# Patient Record
Sex: MALE | Race: WHITE | NOT HISPANIC OR LATINO | Employment: FULL TIME | URBAN - METROPOLITAN AREA
[De-identification: names, ages, dates, MRNs, and addresses within clinical notes are randomized per-mention and may not be internally consistent; named-entity substitution may affect disease eponyms.]

---

## 2018-09-10 ENCOUNTER — HOSPITAL ENCOUNTER (OUTPATIENT)
Dept: FAMILY MEDICINE CLINIC | Facility: CLINIC | Age: 53
Setting detail: SPECIMEN
Discharge: HOME | End: 2018-09-10
Attending: FAMILY MEDICINE | Admitting: FAMILY MEDICINE

## 2018-09-10 LAB
ALBUMIN SERPL-MCNC: 3.9 G/DL (ref 3.5–4.8)
ALBUMIN/GLOB SERPL: 1.2 {RATIO} (ref 1–1.7)
ALP SERPL-CCNC: 58 IU/L (ref 32–91)
ALT SERPL-CCNC: 25 IU/L (ref 17–63)
ANION GAP SERPL CALC-SCNC: 10 MMOL/L (ref 10–20)
AST SERPL-CCNC: 18 IU/L (ref 15–41)
BASOPHILS # BLD AUTO: 0.1 10*3/UL (ref 0–0.2)
BASOPHILS NFR BLD AUTO: 1 % (ref 0–2)
BILIRUB SERPL-MCNC: 0.3 MG/DL (ref 0.3–1.2)
BILIRUB UR QL STRIP: NEGATIVE MG/DL
BUN SERPL-MCNC: 12 MG/DL (ref 8–20)
BUN/CREAT SERPL: 13.3 (ref 6.2–20.3)
CALCIUM SERPL-MCNC: 9.3 MG/DL (ref 8.9–10.3)
CASTS URNS QL MICRO: NORMAL /[LPF]
CHLORIDE SERPL-SCNC: 105 MMOL/L (ref 101–111)
CHOLEST SERPL-MCNC: 182 MG/DL
CHOLEST/HDLC SERPL: 3.8 {RATIO}
COLOR UR: YELLOW
CONV BACTERIA IN URINE MICRO: NEGATIVE
CONV CLARITY OF URINE: CLEAR
CONV CO2: 26 MMOL/L (ref 22–32)
CONV HYALINE CASTS IN URINE MICRO: 0 /[LPF] (ref 0–5)
CONV LDL CHOLESTEROL DIRECT: 116 MG/DL (ref 0–100)
CONV PROTEIN IN URINE BY AUTOMATED TEST STRIP: NEGATIVE MG/DL
CONV SMALL ROUND CELLS: NORMAL /[HPF]
CONV TOTAL PROTEIN: 7.1 G/DL (ref 6.1–7.9)
CONV UROBILINOGEN IN URINE BY AUTOMATED TEST STRIP: 0.2 MG/DL
CREAT UR-MCNC: 0.9 MG/DL (ref 0.7–1.2)
CULTURE INDICATED?: NORMAL
DIFFERENTIAL METHOD BLD: (no result)
EOSINOPHIL # BLD AUTO: 0.2 10*3/UL (ref 0–0.3)
EOSINOPHIL # BLD AUTO: 3 % (ref 0–3)
ERYTHROCYTE [DISTWIDTH] IN BLOOD BY AUTOMATED COUNT: 14 % (ref 11.5–14.5)
GLOBULIN UR ELPH-MCNC: 3.2 G/DL (ref 2.5–3.8)
GLUCOSE SERPL-MCNC: 118 MG/DL (ref 65–99)
GLUCOSE UR QL: NEGATIVE MG/DL
HCT VFR BLD AUTO: 44.4 % (ref 40–54)
HDLC SERPL-MCNC: 49 MG/DL
HGB BLD-MCNC: 15.3 G/DL (ref 14–18)
HGB UR QL STRIP: NEGATIVE
KETONES UR QL STRIP: NEGATIVE MG/DL
LDLC/HDLC SERPL: 2.4 {RATIO}
LEUKOCYTE ESTERASE UR QL STRIP: NEGATIVE
LIPID INTERPRETATION: ABNORMAL
LYMPHOCYTES # BLD AUTO: 2.4 10*3/UL (ref 0.8–4.8)
LYMPHOCYTES NFR BLD AUTO: 27 % (ref 18–42)
MCH RBC QN AUTO: 29 PG (ref 26–32)
MCHC RBC AUTO-ENTMCNC: 34.4 G/DL (ref 32–36)
MCV RBC AUTO: 84.3 FL (ref 80–94)
MONOCYTES # BLD AUTO: 0.6 10*3/UL (ref 0.1–1.3)
MONOCYTES NFR BLD AUTO: 7 % (ref 2–11)
NEUTROPHILS # BLD AUTO: 5.5 10*3/UL (ref 2.3–8.6)
NEUTROPHILS NFR BLD AUTO: 62 % (ref 50–75)
NITRITE UR QL STRIP: NEGATIVE
NRBC BLD AUTO-RTO: 0 /100{WBCS}
NRBC/RBC NFR BLD MANUAL: 0 10*3/UL
PH UR STRIP.AUTO: 5 [PH] (ref 4.5–8)
PLATELET # BLD AUTO: 249 10*3/UL (ref 150–450)
PMV BLD AUTO: 7.5 FL (ref 7.4–10.4)
POTASSIUM SERPL-SCNC: 4 MMOL/L (ref 3.6–5.1)
RBC # BLD AUTO: 5.27 10*6/UL (ref 4.6–6)
RBC #/AREA URNS HPF: 0 /[HPF] (ref 0–3)
SODIUM SERPL-SCNC: 137 MMOL/L (ref 136–144)
SP GR UR: 1.02 (ref 1–1.03)
SPERM URNS QL MICRO: NORMAL /[HPF]
SQUAMOUS SPT QL MICRO: 0 /[HPF] (ref 0–5)
TRIGL SERPL-MCNC: 115 MG/DL
UNIDENT CRYS URNS QL MICRO: NORMAL /[HPF]
VLDLC SERPL CALC-MCNC: 17.4 MG/DL
WBC # BLD AUTO: 8.8 10*3/UL (ref 4.5–11.5)
WBC #/AREA URNS HPF: 0 /[HPF] (ref 0–5)
YEAST SPEC QL WET PREP: NORMAL /[HPF]

## 2019-09-03 RX ORDER — LISINOPRIL AND HYDROCHLOROTHIAZIDE 12.5; 1 MG/1; MG/1
TABLET ORAL
Qty: 90 TABLET | Refills: 2 | Status: SHIPPED | OUTPATIENT
Start: 2019-09-03 | End: 2020-06-01

## 2020-06-01 RX ORDER — LISINOPRIL AND HYDROCHLOROTHIAZIDE 12.5; 1 MG/1; MG/1
TABLET ORAL
Qty: 90 TABLET | Refills: 1 | Status: SHIPPED | OUTPATIENT
Start: 2020-06-01 | End: 2020-11-26

## 2020-11-26 RX ORDER — LISINOPRIL AND HYDROCHLOROTHIAZIDE 12.5; 1 MG/1; MG/1
TABLET ORAL
Qty: 90 TABLET | Refills: 0 | Status: SHIPPED | OUTPATIENT
Start: 2020-11-26 | End: 2021-02-22

## 2021-02-22 RX ORDER — LISINOPRIL AND HYDROCHLOROTHIAZIDE 12.5; 1 MG/1; MG/1
TABLET ORAL
Qty: 90 TABLET | Refills: 0 | Status: SHIPPED | OUTPATIENT
Start: 2021-02-22 | End: 2021-05-20

## 2021-05-20 RX ORDER — LISINOPRIL AND HYDROCHLOROTHIAZIDE 12.5; 1 MG/1; MG/1
TABLET ORAL
Qty: 30 TABLET | Refills: 0 | Status: SHIPPED | OUTPATIENT
Start: 2021-05-20 | End: 2021-06-16

## 2021-06-16 RX ORDER — LISINOPRIL AND HYDROCHLOROTHIAZIDE 12.5; 1 MG/1; MG/1
TABLET ORAL
Qty: 30 TABLET | Refills: 0 | Status: SHIPPED | OUTPATIENT
Start: 2021-06-16 | End: 2021-07-14

## 2021-07-14 RX ORDER — LISINOPRIL AND HYDROCHLOROTHIAZIDE 12.5; 1 MG/1; MG/1
TABLET ORAL
Qty: 90 TABLET | Refills: 1 | Status: SHIPPED | OUTPATIENT
Start: 2021-07-14 | End: 2022-01-10

## 2021-11-09 DIAGNOSIS — E55.9 VITAMIN D DEFICIENCY: ICD-10-CM

## 2021-11-09 DIAGNOSIS — Z00.00 ENCOUNTER FOR PREVENTIVE CARE: ICD-10-CM

## 2021-11-09 DIAGNOSIS — Z12.5 SCREENING FOR PROSTATE CANCER: Primary | ICD-10-CM

## 2021-11-09 DIAGNOSIS — Z13.220 SCREENING FOR HYPERLIPIDEMIA: ICD-10-CM

## 2021-11-15 ENCOUNTER — LAB (OUTPATIENT)
Dept: FAMILY MEDICINE CLINIC | Facility: CLINIC | Age: 56
End: 2021-11-15

## 2021-11-15 DIAGNOSIS — Z12.5 SCREENING FOR PROSTATE CANCER: ICD-10-CM

## 2021-11-15 DIAGNOSIS — E55.9 VITAMIN D DEFICIENCY: ICD-10-CM

## 2021-11-15 DIAGNOSIS — Z00.00 ENCOUNTER FOR PREVENTIVE CARE: ICD-10-CM

## 2021-11-15 DIAGNOSIS — Z13.220 SCREENING FOR HYPERLIPIDEMIA: ICD-10-CM

## 2021-11-15 LAB
25(OH)D3 SERPL-MCNC: 53.4 NG/ML (ref 30–100)
ALBUMIN SERPL-MCNC: 4.4 G/DL (ref 3.5–5.2)
ALBUMIN/GLOB SERPL: 1.5 G/DL
ALP SERPL-CCNC: 49 U/L (ref 39–117)
ALT SERPL W P-5'-P-CCNC: 19 U/L (ref 1–41)
ANION GAP SERPL CALCULATED.3IONS-SCNC: 7.8 MMOL/L (ref 5–15)
AST SERPL-CCNC: 17 U/L (ref 1–40)
BASOPHILS # BLD AUTO: 0.04 10*3/MM3 (ref 0–0.2)
BASOPHILS NFR BLD AUTO: 0.6 % (ref 0–1.5)
BILIRUB SERPL-MCNC: 0.3 MG/DL (ref 0–1.2)
BILIRUB UR QL STRIP: NEGATIVE
BUN SERPL-MCNC: 17 MG/DL (ref 6–20)
BUN/CREAT SERPL: 20 (ref 7–25)
CALCIUM SPEC-SCNC: 9.6 MG/DL (ref 8.6–10.5)
CHLORIDE SERPL-SCNC: 105 MMOL/L (ref 98–107)
CHOLEST SERPL-MCNC: 169 MG/DL (ref 0–200)
CLARITY UR: CLEAR
CO2 SERPL-SCNC: 27.2 MMOL/L (ref 22–29)
COLOR UR: YELLOW
CREAT SERPL-MCNC: 0.85 MG/DL (ref 0.76–1.27)
DEPRECATED RDW RBC AUTO: 38.1 FL (ref 37–54)
EOSINOPHIL # BLD AUTO: 0.15 10*3/MM3 (ref 0–0.4)
EOSINOPHIL NFR BLD AUTO: 2.1 % (ref 0.3–6.2)
ERYTHROCYTE [DISTWIDTH] IN BLOOD BY AUTOMATED COUNT: 12.8 % (ref 12.3–15.4)
GFR SERPL CREATININE-BSD FRML MDRD: 93 ML/MIN/1.73
GLOBULIN UR ELPH-MCNC: 2.9 GM/DL
GLUCOSE SERPL-MCNC: 113 MG/DL (ref 65–99)
GLUCOSE UR STRIP-MCNC: NEGATIVE MG/DL
HBA1C MFR BLD: 5.3 % (ref 3.5–5.6)
HCT VFR BLD AUTO: 43.7 % (ref 37.5–51)
HDLC SERPL-MCNC: 49 MG/DL (ref 40–60)
HGB BLD-MCNC: 15.1 G/DL (ref 13–17.7)
HGB UR QL STRIP.AUTO: NEGATIVE
IMM GRANULOCYTES # BLD AUTO: 0.02 10*3/MM3 (ref 0–0.05)
IMM GRANULOCYTES NFR BLD AUTO: 0.3 % (ref 0–0.5)
KETONES UR QL STRIP: NEGATIVE
LDLC SERPL CALC-MCNC: 101 MG/DL (ref 0–100)
LDLC/HDLC SERPL: 2.02 {RATIO}
LEUKOCYTE ESTERASE UR QL STRIP.AUTO: NEGATIVE
LYMPHOCYTES # BLD AUTO: 2.14 10*3/MM3 (ref 0.7–3.1)
LYMPHOCYTES NFR BLD AUTO: 29.4 % (ref 19.6–45.3)
MCH RBC QN AUTO: 28.8 PG (ref 26.6–33)
MCHC RBC AUTO-ENTMCNC: 34.6 G/DL (ref 31.5–35.7)
MCV RBC AUTO: 83.4 FL (ref 79–97)
MONOCYTES # BLD AUTO: 0.5 10*3/MM3 (ref 0.1–0.9)
MONOCYTES NFR BLD AUTO: 6.9 % (ref 5–12)
NEUTROPHILS NFR BLD AUTO: 4.42 10*3/MM3 (ref 1.7–7)
NEUTROPHILS NFR BLD AUTO: 60.7 % (ref 42.7–76)
NITRITE UR QL STRIP: NEGATIVE
NRBC BLD AUTO-RTO: 0 /100 WBC (ref 0–0.2)
PH UR STRIP.AUTO: 5.5 [PH] (ref 5–8)
PLATELET # BLD AUTO: 265 10*3/MM3 (ref 140–450)
PMV BLD AUTO: 9.4 FL (ref 6–12)
POTASSIUM SERPL-SCNC: 4.6 MMOL/L (ref 3.5–5.2)
PROT SERPL-MCNC: 7.3 G/DL (ref 6–8.5)
PROT UR QL STRIP: NEGATIVE
PSA SERPL-MCNC: 0.36 NG/ML (ref 0–4)
RBC # BLD AUTO: 5.24 10*6/MM3 (ref 4.14–5.8)
SODIUM SERPL-SCNC: 140 MMOL/L (ref 136–145)
SP GR UR STRIP: 1.02 (ref 1–1.03)
TRIGL SERPL-MCNC: 106 MG/DL (ref 0–150)
TSH SERPL DL<=0.05 MIU/L-ACNC: 0.57 UIU/ML (ref 0.27–4.2)
UROBILINOGEN UR QL STRIP: NORMAL
VLDLC SERPL-MCNC: 19 MG/DL (ref 5–40)
WBC # BLD AUTO: 7.27 10*3/MM3 (ref 3.4–10.8)

## 2021-11-15 PROCEDURE — 80061 LIPID PANEL: CPT | Performed by: FAMILY MEDICINE

## 2021-11-15 PROCEDURE — 81003 URINALYSIS AUTO W/O SCOPE: CPT | Performed by: FAMILY MEDICINE

## 2021-11-15 PROCEDURE — 85025 COMPLETE CBC W/AUTO DIFF WBC: CPT | Performed by: FAMILY MEDICINE

## 2021-11-15 PROCEDURE — 82306 VITAMIN D 25 HYDROXY: CPT | Performed by: FAMILY MEDICINE

## 2021-11-15 PROCEDURE — 84443 ASSAY THYROID STIM HORMONE: CPT | Performed by: FAMILY MEDICINE

## 2021-11-15 PROCEDURE — 80053 COMPREHEN METABOLIC PANEL: CPT | Performed by: FAMILY MEDICINE

## 2021-11-15 PROCEDURE — 36415 COLL VENOUS BLD VENIPUNCTURE: CPT

## 2021-11-15 PROCEDURE — G0103 PSA SCREENING: HCPCS | Performed by: FAMILY MEDICINE

## 2021-11-15 PROCEDURE — 83036 HEMOGLOBIN GLYCOSYLATED A1C: CPT | Performed by: FAMILY MEDICINE

## 2021-11-17 ENCOUNTER — OFFICE VISIT (OUTPATIENT)
Dept: FAMILY MEDICINE CLINIC | Facility: CLINIC | Age: 56
End: 2021-11-17

## 2021-11-17 VITALS
HEART RATE: 78 BPM | SYSTOLIC BLOOD PRESSURE: 142 MMHG | HEIGHT: 71 IN | BODY MASS INDEX: 37.66 KG/M2 | DIASTOLIC BLOOD PRESSURE: 86 MMHG | TEMPERATURE: 96.9 F | OXYGEN SATURATION: 98 % | RESPIRATION RATE: 16 BRPM | WEIGHT: 269 LBS

## 2021-11-17 DIAGNOSIS — Z12.11 SCREEN FOR COLON CANCER: ICD-10-CM

## 2021-11-17 DIAGNOSIS — E55.9 VITAMIN D DEFICIENCY: ICD-10-CM

## 2021-11-17 DIAGNOSIS — I10 ESSENTIAL HYPERTENSION: ICD-10-CM

## 2021-11-17 DIAGNOSIS — E66.3 OVERWEIGHT: ICD-10-CM

## 2021-11-17 DIAGNOSIS — Z00.00 PREVENTATIVE HEALTH CARE: Primary | ICD-10-CM

## 2021-11-17 PROCEDURE — 99396 PREV VISIT EST AGE 40-64: CPT | Performed by: FAMILY MEDICINE

## 2021-11-17 RX ORDER — MELATONIN
EVERY 24 HOURS
COMMUNITY
Start: 2019-04-19

## 2021-11-17 NOTE — PROGRESS NOTES
"Chief Complaint  Annual Exam    Subjective          Pineda Tovar Jr presents to [facility]  For annual exam. He has brought in his BP machine his reading is 142/86 in the office.  Overall he is doing well.  Home BP is great.   Feels good.  Work is tough but he is getting through.      Objective   Vital Signs:   /86 (BP Location: Right arm)   Pulse 78   Temp 96.9 °F (36.1 °C) (Infrared)   Resp 16   Ht 180.3 cm (71\")   Wt 122 kg (269 lb)   SpO2 98%   BMI 37.52 kg/m²     Physical Exam  Constitutional:       Appearance: Normal appearance. He is well-developed and normal weight.   HENT:      Head: Normocephalic and atraumatic.      Right Ear: Tympanic membrane, ear canal and external ear normal.      Left Ear: Tympanic membrane, ear canal and external ear normal.      Nose: Nose normal.      Mouth/Throat:      Mouth: Mucous membranes are moist.      Pharynx: Oropharynx is clear. No oropharyngeal exudate.   Eyes:      Extraocular Movements: Extraocular movements intact.      Conjunctiva/sclera: Conjunctivae normal.      Pupils: Pupils are equal, round, and reactive to light.   Cardiovascular:      Rate and Rhythm: Normal rate and regular rhythm.      Pulses: Normal pulses.      Heart sounds: Normal heart sounds.   Pulmonary:      Effort: Pulmonary effort is normal.      Breath sounds: Normal breath sounds.   Abdominal:      General: Bowel sounds are normal.      Palpations: Abdomen is soft.   Musculoskeletal:         General: Normal range of motion.      Cervical back: Normal range of motion and neck supple.   Skin:     General: Skin is warm and dry.   Neurological:      General: No focal deficit present.      Mental Status: He is alert and oriented to person, place, and time. Mental status is at baseline.   Psychiatric:         Mood and Affect: Mood normal.         Behavior: Behavior normal.         Thought Content: Thought content normal.         Judgment: Judgment normal. "        Result Review :                 Assessment and Plan    Diagnoses and all orders for this visit:    1. Preventative health care (Primary)  Assessment & Plan:  Patient needs to have colonoscopy.    Continue meds.      2. Essential hypertension  Assessment & Plan:  Patient is doing well.   BP is 138/88      3. Overweight  Assessment & Plan:  Patient is trying to loose weight.   Over 53656 steps a day.      4. Vitamin D deficiency  Assessment & Plan:  Cont vit D      5. Screen for colon cancer      Follow Up   Return in about 6 months (around 5/17/2022) for Recheck.  Patient was given instructions and counseling regarding his condition or for health maintenance advice. Please see specific information pulled into the AVS if appropriate.

## 2021-11-19 ENCOUNTER — PATIENT ROUNDING (BHMG ONLY) (OUTPATIENT)
Dept: FAMILY MEDICINE CLINIC | Facility: CLINIC | Age: 56
End: 2021-11-19

## 2021-11-19 NOTE — PROGRESS NOTES
November 19, 2021    Hello, may I speak with Pineda Tovar Jr?    My name is Keila      I am  with [facility]  [address]  [place] [address].    Before we get started may I verify your date of birth? 1965    I am calling to officially welcome you to our practice and ask about your recent visit. Is this a good time to talk? No. Voicemail left.    Tell me about your visit with us. What things went well?  Voicemail left.       We're always looking for ways to make our patients' experiences even better. Do you have recommendations on ways we may improve?  Voicemail left.     Overall were you satisfied with your first visit to our practice? Voicemail left.       I appreciate you taking the time to speak with me today. Is there anything else I can do for you? Voicemail left.      Thank you, and have a great day.

## 2022-01-10 RX ORDER — LISINOPRIL AND HYDROCHLOROTHIAZIDE 12.5; 1 MG/1; MG/1
TABLET ORAL
Qty: 90 TABLET | Refills: 1 | Status: SHIPPED | OUTPATIENT
Start: 2022-01-10 | End: 2022-07-06

## 2022-07-06 RX ORDER — LISINOPRIL AND HYDROCHLOROTHIAZIDE 12.5; 1 MG/1; MG/1
TABLET ORAL
Qty: 30 TABLET | Refills: 2 | Status: SHIPPED | OUTPATIENT
Start: 2022-07-06 | End: 2022-10-03

## 2022-10-03 RX ORDER — LISINOPRIL AND HYDROCHLOROTHIAZIDE 12.5; 1 MG/1; MG/1
TABLET ORAL
Qty: 30 TABLET | Refills: 2 | Status: SHIPPED | OUTPATIENT
Start: 2022-10-03 | End: 2022-12-29

## 2022-10-27 ENCOUNTER — OFFICE VISIT (OUTPATIENT)
Dept: FAMILY MEDICINE CLINIC | Facility: CLINIC | Age: 57
End: 2022-10-27

## 2022-10-27 ENCOUNTER — HOSPITAL ENCOUNTER (OUTPATIENT)
Dept: GENERAL RADIOLOGY | Facility: HOSPITAL | Age: 57
Discharge: HOME | End: 2022-10-27
Admitting: FAMILY MEDICINE

## 2022-10-27 DIAGNOSIS — M17.0 PRIMARY OSTEOARTHRITIS OF BOTH KNEES: Primary | ICD-10-CM

## 2022-10-27 DIAGNOSIS — M17.0 PRIMARY OSTEOARTHRITIS OF BOTH KNEES: ICD-10-CM

## 2022-10-27 PROBLEM — Z94.7 HISTORY OF CORNEA TRANSPLANT: Status: ACTIVE | Noted: 2018-11-29

## 2022-10-27 PROBLEM — Z96.1 PSEUDOPHAKIA OF LEFT EYE: Status: ACTIVE | Noted: 2018-11-29

## 2022-10-27 PROCEDURE — 99213 OFFICE O/P EST LOW 20 MIN: CPT | Performed by: FAMILY MEDICINE

## 2022-10-27 PROCEDURE — 73562 X-RAY EXAM OF KNEE 3: CPT

## 2022-10-27 RX ORDER — FLUOROMETHOLONE 0.1 %
SUSPENSION, DROPS(FINAL DOSAGE FORM)(ML) OPHTHALMIC (EYE)
COMMUNITY
Start: 2022-10-10

## 2022-10-27 RX ORDER — MIRTAZAPINE 45 MG/1
45 TABLET, FILM COATED ORAL NIGHTLY
Qty: 30 TABLET | Refills: 0 | Status: SHIPPED | OUTPATIENT
Start: 2022-10-27 | End: 2022-10-27

## 2022-10-27 RX ORDER — MELOXICAM 15 MG/1
15 TABLET ORAL DAILY
Qty: 30 TABLET | Refills: 1 | Status: SHIPPED | OUTPATIENT
Start: 2022-10-27 | End: 2022-12-26

## 2022-10-27 NOTE — PROGRESS NOTES
"Chief Complaint  No chief complaint on file.    Subjective      Pineda Tovar Jr presents to [facility]  History of Present Illness    Objective   Vital Signs:  There were no vitals taken for this visit.  Estimated body mass index is 37.52 kg/m² as calculated from the following:    Height as of 11/17/21: 180.3 cm (71\").    Weight as of 11/17/21: 122 kg (269 lb).    Class 2 Severe Obesity (BMI >=35 and <=39.9). Obesity-related health conditions include the following: hypertension and osteoarthritis. Obesity is unchanged. BMI is is above average; BMI management plan is completed. We discussed low calorie, low carb based diet program, portion control, increasing exercise, joining a fitness center or start home based exercise program and Weight Watchers or other Commercial based weight reduction program.      Physical Exam  Constitutional:       Appearance: He is obese.   Abdominal:      Comments: Overweight   Musculoskeletal:         General: Swelling, tenderness and deformity present.      Right lower leg: Edema present.      Left lower leg: Edema present.      Comments: Bilateral knee crepitus with movement of knees.  Flexion and extension are both very painful and there is crepitus and popping with knee range of motion testing.  He cannot flex the right knee beyond 90 degrees and he cannot extend it completely.  He still holds it about 10% flexed when he tries to fully extend.  When he bends it he can bend to just 90 degrees and then it becomes stiff and painful right behind in the probably from the cyst formation.  He has a slight genu varus deformity both sides in the medial aspect of both knees along the joint line is very tender.  I do not feel much joint space on either the but he can move the left knee a little better than the right and the left knee is not near as tender as the right.  Right knee tends to throb and hurt particular at night.   Neurological:      Mental Status: " He is alert.        Assessment and Plan Diagnoses and all orders for this visit:    1. Primary osteoarthritis of both knees (Primary)   -- I am going to send Pineda to the hospital for bilateral knee x-rays.  I will place him on meloxicam 15 mg a day.  We will schedule him to see Dr. Eloy Jimenez --this was requested by the patient and his wife.  In the meantime he can place ice on the knees at night when he gets home and he should place heat on them in the morning before he goes to work.  It is going to take a while for him to get into see Dr. Jimenez I may bring him back in for some steroid injections but I do not think it will take too long for him to get him.  We will see what the x-ray shows and we will see what his response to the meloxicam is.         I spent 21 minutes caring for Pineda on this date of service. This time includes time spent by me in the following activities:preparing for the visit, reviewing tests, obtaining and/or reviewing a separately obtained history, performing a medically appropriate examination and/or evaluation , counseling and educating the patient/family/caregiver, ordering medications, tests, or procedures, referring and communicating with other health care professionals , documenting information in the medical record, independently interpreting results and communicating that information with the patient/family/caregiver and care coordination         Follow Up       Return in about 6 weeks (around 12/8/2022), or if symptoms worsen or fail to improve, for Recheck--if knees hurting.  .  Patient was given instructions and counseling regarding his condition or for health maintenance advice. Please see specific information pulled into the AVS if appropriate.       Answers for HPI/ROS submitted by the patient on 10/26/2022  What is the primary reason for your visit?: Other  Please describe your symptoms.: Right knee  Have you had these symptoms before?: Yes  How long have you been  having these symptoms?: Greater than 2 weeks  Please list any medications you are currently taking for this condition.: Lisinopril  Please describe any probable cause for these symptoms. 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

## 2022-11-22 DIAGNOSIS — Z00.00 PREVENTATIVE HEALTH CARE: Primary | ICD-10-CM

## 2022-11-22 DIAGNOSIS — Z12.5 SCREENING FOR PROSTATE CANCER: ICD-10-CM

## 2022-11-22 RX ORDER — MIRTAZAPINE 45 MG/1
TABLET, FILM COATED ORAL
Qty: 30 TABLET | Refills: 0 | OUTPATIENT
Start: 2022-11-22

## 2022-11-28 ENCOUNTER — LAB (OUTPATIENT)
Dept: FAMILY MEDICINE CLINIC | Facility: CLINIC | Age: 57
End: 2022-11-28

## 2022-11-28 DIAGNOSIS — Z12.5 SCREENING FOR PROSTATE CANCER: ICD-10-CM

## 2022-11-28 DIAGNOSIS — Z00.00 PREVENTATIVE HEALTH CARE: ICD-10-CM

## 2022-11-28 LAB
ALBUMIN SERPL-MCNC: 4.2 G/DL (ref 3.5–5.2)
ALBUMIN/GLOB SERPL: 1.8 G/DL
ALP SERPL-CCNC: 47 U/L (ref 39–117)
ALT SERPL W P-5'-P-CCNC: 26 U/L (ref 1–41)
ANION GAP SERPL CALCULATED.3IONS-SCNC: 13.8 MMOL/L (ref 5–15)
AST SERPL-CCNC: 15 U/L (ref 1–40)
BASOPHILS # BLD AUTO: 0.04 10*3/MM3 (ref 0–0.2)
BASOPHILS NFR BLD AUTO: 0.4 % (ref 0–1.5)
BILIRUB SERPL-MCNC: 0.5 MG/DL (ref 0–1.2)
BILIRUB UR QL STRIP: NEGATIVE
BUN SERPL-MCNC: 13 MG/DL (ref 6–20)
BUN/CREAT SERPL: 13.7 (ref 7–25)
CALCIUM SPEC-SCNC: 9.4 MG/DL (ref 8.6–10.5)
CHLORIDE SERPL-SCNC: 104 MMOL/L (ref 98–107)
CHOLEST SERPL-MCNC: 133 MG/DL (ref 0–200)
CLARITY UR: CLEAR
CO2 SERPL-SCNC: 26.2 MMOL/L (ref 22–29)
COLOR UR: YELLOW
CREAT SERPL-MCNC: 0.95 MG/DL (ref 0.76–1.27)
DEPRECATED RDW RBC AUTO: 40.9 FL (ref 37–54)
EGFRCR SERPLBLD CKD-EPI 2021: 93.4 ML/MIN/1.73
EOSINOPHIL # BLD AUTO: 0.23 10*3/MM3 (ref 0–0.4)
EOSINOPHIL NFR BLD AUTO: 2.6 % (ref 0.3–6.2)
ERYTHROCYTE [DISTWIDTH] IN BLOOD BY AUTOMATED COUNT: 13.1 % (ref 12.3–15.4)
GLOBULIN UR ELPH-MCNC: 2.3 GM/DL
GLUCOSE SERPL-MCNC: 82 MG/DL (ref 65–99)
GLUCOSE UR STRIP-MCNC: NEGATIVE MG/DL
HCT VFR BLD AUTO: 42.6 % (ref 37.5–51)
HDLC SERPL-MCNC: 39 MG/DL (ref 40–60)
HGB BLD-MCNC: 13.9 G/DL (ref 13–17.7)
HGB UR QL STRIP.AUTO: NEGATIVE
IMM GRANULOCYTES # BLD AUTO: 0.03 10*3/MM3 (ref 0–0.05)
IMM GRANULOCYTES NFR BLD AUTO: 0.3 % (ref 0–0.5)
KETONES UR QL STRIP: NEGATIVE
LDLC SERPL CALC-MCNC: 70 MG/DL (ref 0–100)
LDLC/HDLC SERPL: 1.7 {RATIO}
LEUKOCYTE ESTERASE UR QL STRIP.AUTO: NEGATIVE
LYMPHOCYTES # BLD AUTO: 3.04 10*3/MM3 (ref 0.7–3.1)
LYMPHOCYTES NFR BLD AUTO: 34.2 % (ref 19.6–45.3)
MCH RBC QN AUTO: 27.9 PG (ref 26.6–33)
MCHC RBC AUTO-ENTMCNC: 32.6 G/DL (ref 31.5–35.7)
MCV RBC AUTO: 85.5 FL (ref 79–97)
MONOCYTES # BLD AUTO: 0.59 10*3/MM3 (ref 0.1–0.9)
MONOCYTES NFR BLD AUTO: 6.6 % (ref 5–12)
NEUTROPHILS NFR BLD AUTO: 4.96 10*3/MM3 (ref 1.7–7)
NEUTROPHILS NFR BLD AUTO: 55.9 % (ref 42.7–76)
NITRITE UR QL STRIP: NEGATIVE
NRBC BLD AUTO-RTO: 0 /100 WBC (ref 0–0.2)
PH UR STRIP.AUTO: 5.5 [PH] (ref 5–8)
PLATELET # BLD AUTO: 268 10*3/MM3 (ref 140–450)
PMV BLD AUTO: 9.3 FL (ref 6–12)
POTASSIUM SERPL-SCNC: 3.7 MMOL/L (ref 3.5–5.2)
PROT SERPL-MCNC: 6.5 G/DL (ref 6–8.5)
PROT UR QL STRIP: NEGATIVE
PSA SERPL-MCNC: 0.3 NG/ML (ref 0–4)
RBC # BLD AUTO: 4.98 10*6/MM3 (ref 4.14–5.8)
SODIUM SERPL-SCNC: 144 MMOL/L (ref 136–145)
SP GR UR STRIP: 1.02 (ref 1–1.03)
TRIGL SERPL-MCNC: 139 MG/DL (ref 0–150)
TSH SERPL DL<=0.05 MIU/L-ACNC: 0.92 UIU/ML (ref 0.27–4.2)
UROBILINOGEN UR QL STRIP: NORMAL
VLDLC SERPL-MCNC: 24 MG/DL (ref 5–40)
WBC NRBC COR # BLD: 8.89 10*3/MM3 (ref 3.4–10.8)

## 2022-11-28 PROCEDURE — G0103 PSA SCREENING: HCPCS | Performed by: FAMILY MEDICINE

## 2022-11-28 PROCEDURE — 80050 GENERAL HEALTH PANEL: CPT | Performed by: FAMILY MEDICINE

## 2022-11-28 PROCEDURE — 81003 URINALYSIS AUTO W/O SCOPE: CPT | Performed by: FAMILY MEDICINE

## 2022-11-28 PROCEDURE — 36415 COLL VENOUS BLD VENIPUNCTURE: CPT

## 2022-11-28 PROCEDURE — 80061 LIPID PANEL: CPT | Performed by: FAMILY MEDICINE

## 2022-11-30 ENCOUNTER — OFFICE VISIT (OUTPATIENT)
Dept: FAMILY MEDICINE CLINIC | Facility: CLINIC | Age: 57
End: 2022-11-30

## 2022-11-30 VITALS
RESPIRATION RATE: 16 BRPM | WEIGHT: 278 LBS | DIASTOLIC BLOOD PRESSURE: 80 MMHG | HEART RATE: 96 BPM | HEIGHT: 71 IN | OXYGEN SATURATION: 97 % | SYSTOLIC BLOOD PRESSURE: 154 MMHG | BODY MASS INDEX: 38.92 KG/M2

## 2022-11-30 DIAGNOSIS — Z00.00 PREVENTATIVE HEALTH CARE: Primary | ICD-10-CM

## 2022-11-30 DIAGNOSIS — M17.0 PRIMARY OSTEOARTHRITIS OF BOTH KNEES: ICD-10-CM

## 2022-11-30 DIAGNOSIS — I10 ESSENTIAL HYPERTENSION: ICD-10-CM

## 2022-11-30 DIAGNOSIS — E66.3 OVERWEIGHT: ICD-10-CM

## 2022-11-30 PROCEDURE — 99213 OFFICE O/P EST LOW 20 MIN: CPT | Performed by: FAMILY MEDICINE

## 2022-11-30 RX ORDER — MIRTAZAPINE 45 MG/1
TABLET, FILM COATED ORAL
COMMUNITY
Start: 2022-10-27

## 2022-12-08 ENCOUNTER — HOSPITAL ENCOUNTER (OUTPATIENT)
Dept: CARDIOLOGY | Facility: HOSPITAL | Age: 57
Discharge: HOME | End: 2022-12-08

## 2022-12-08 ENCOUNTER — LAB (OUTPATIENT)
Dept: LAB | Facility: HOSPITAL | Age: 57
End: 2022-12-08

## 2022-12-08 ENCOUNTER — TRANSCRIBE ORDERS (OUTPATIENT)
Dept: ADMINISTRATIVE | Facility: HOSPITAL | Age: 57
End: 2022-12-08

## 2022-12-08 DIAGNOSIS — Z01.818 PRE-OP TESTING: Primary | ICD-10-CM

## 2022-12-08 DIAGNOSIS — Z01.818 PRE-OP TESTING: ICD-10-CM

## 2022-12-08 LAB
ALBUMIN SERPL-MCNC: 4.5 G/DL (ref 3.5–5.2)
ANION GAP SERPL CALCULATED.3IONS-SCNC: 12.6 MMOL/L (ref 5–15)
BASOPHILS # BLD AUTO: 0.05 10*3/MM3 (ref 0–0.2)
BASOPHILS NFR BLD AUTO: 0.5 % (ref 0–1.5)
BUN SERPL-MCNC: 29 MG/DL (ref 6–20)
BUN/CREAT SERPL: 27.4 (ref 7–25)
CALCIUM SPEC-SCNC: 9.8 MG/DL (ref 8.6–10.5)
CHLORIDE SERPL-SCNC: 101 MMOL/L (ref 98–107)
CO2 SERPL-SCNC: 26.4 MMOL/L (ref 22–29)
CREAT SERPL-MCNC: 1.06 MG/DL (ref 0.76–1.27)
DEPRECATED RDW RBC AUTO: 40.4 FL (ref 37–54)
EGFRCR SERPLBLD CKD-EPI 2021: 81.9 ML/MIN/1.73
EOSINOPHIL # BLD AUTO: 0.14 10*3/MM3 (ref 0–0.4)
EOSINOPHIL NFR BLD AUTO: 1.4 % (ref 0.3–6.2)
ERYTHROCYTE [DISTWIDTH] IN BLOOD BY AUTOMATED COUNT: 13.1 % (ref 12.3–15.4)
GLUCOSE SERPL-MCNC: 92 MG/DL (ref 65–99)
HBA1C MFR BLD: 5.1 % (ref 3.5–5.6)
HCT VFR BLD AUTO: 44.3 % (ref 37.5–51)
HGB BLD-MCNC: 14.7 G/DL (ref 13–17.7)
IMM GRANULOCYTES # BLD AUTO: 0.02 10*3/MM3 (ref 0–0.05)
IMM GRANULOCYTES NFR BLD AUTO: 0.2 % (ref 0–0.5)
LYMPHOCYTES # BLD AUTO: 2.35 10*3/MM3 (ref 0.7–3.1)
LYMPHOCYTES NFR BLD AUTO: 23.8 % (ref 19.6–45.3)
MCH RBC QN AUTO: 28.2 PG (ref 26.6–33)
MCHC RBC AUTO-ENTMCNC: 33.2 G/DL (ref 31.5–35.7)
MCV RBC AUTO: 85 FL (ref 79–97)
MONOCYTES # BLD AUTO: 0.9 10*3/MM3 (ref 0.1–0.9)
MONOCYTES NFR BLD AUTO: 9.1 % (ref 5–12)
MRSA DNA SPEC QL NAA+PROBE: NORMAL
NEUTROPHILS NFR BLD AUTO: 6.41 10*3/MM3 (ref 1.7–7)
NEUTROPHILS NFR BLD AUTO: 65 % (ref 42.7–76)
NRBC BLD AUTO-RTO: 0 /100 WBC (ref 0–0.2)
PLATELET # BLD AUTO: 262 10*3/MM3 (ref 140–450)
PMV BLD AUTO: 9.2 FL (ref 6–12)
POTASSIUM SERPL-SCNC: 4 MMOL/L (ref 3.5–5.2)
RBC # BLD AUTO: 5.21 10*6/MM3 (ref 4.14–5.8)
SODIUM SERPL-SCNC: 140 MMOL/L (ref 136–145)
WBC NRBC COR # BLD: 9.87 10*3/MM3 (ref 3.4–10.8)

## 2022-12-08 PROCEDURE — 80048 BASIC METABOLIC PNL TOTAL CA: CPT

## 2022-12-08 PROCEDURE — 87641 MR-STAPH DNA AMP PROBE: CPT

## 2022-12-08 PROCEDURE — 83036 HEMOGLOBIN GLYCOSYLATED A1C: CPT

## 2022-12-08 PROCEDURE — 85025 COMPLETE CBC W/AUTO DIFF WBC: CPT

## 2022-12-08 PROCEDURE — 93005 ELECTROCARDIOGRAM TRACING: CPT | Performed by: ORTHOPAEDIC SURGERY

## 2022-12-08 PROCEDURE — 93010 ELECTROCARDIOGRAM REPORT: CPT | Performed by: INTERNAL MEDICINE

## 2022-12-08 PROCEDURE — 82040 ASSAY OF SERUM ALBUMIN: CPT

## 2022-12-08 PROCEDURE — 36415 COLL VENOUS BLD VENIPUNCTURE: CPT

## 2022-12-15 LAB — QT INTERVAL: 415 MS

## 2022-12-29 RX ORDER — LISINOPRIL AND HYDROCHLOROTHIAZIDE 12.5; 1 MG/1; MG/1
TABLET ORAL
Qty: 30 TABLET | Refills: 2 | Status: SHIPPED | OUTPATIENT
Start: 2022-12-29 | End: 2023-03-27

## 2022-12-30 ENCOUNTER — TRANSCRIBE ORDERS (OUTPATIENT)
Dept: PHYSICAL THERAPY | Facility: CLINIC | Age: 57
End: 2022-12-30

## 2022-12-30 DIAGNOSIS — Z96.651 PRESENCE OF RIGHT ARTIFICIAL KNEE JOINT: Primary | ICD-10-CM

## 2023-03-27 RX ORDER — LISINOPRIL AND HYDROCHLOROTHIAZIDE 12.5; 1 MG/1; MG/1
TABLET ORAL
Qty: 30 TABLET | Refills: 2 | Status: SHIPPED | OUTPATIENT
Start: 2023-03-27

## 2023-10-20 RX ORDER — LISINOPRIL AND HYDROCHLOROTHIAZIDE 12.5; 1 MG/1; MG/1
1 TABLET ORAL DAILY
Qty: 30 TABLET | Refills: 3 | Status: SHIPPED | OUTPATIENT
Start: 2023-10-20

## 2023-10-20 NOTE — TELEPHONE ENCOUNTER
Caller: Pineda Tovar Jr    Relationship: Self    Best call back number: [phone]    Requested Prescriptions:   Requested Prescriptions     Pending Prescriptions Disp Refills    lisinopril-hydrochlorothiazide (PRINZIDE,ZESTORETIC) 10-12.5 MG per tablet 30 tablet 2     Sig: Take 1 tablet by mouth Daily.        Pharmacy where request should be sent: [facility] [id number] [facility] [place] [place]  [address] - [phone]  - [phone]     Last office visit with prescribing clinician: 11/30/2022   Last telemedicine visit with prescribing clinician: Visit date not found   Next office visit with prescribing clinician: 12/13/2023     Additional details provided by patient: PATIENT HAS A 3 WEEK SUPPLY LEFT OF THIS MEDICATION.     Does the patient have less than a 3 day supply:  [] Yes  [x] No    Would you like a call back once the refill request has been completed: [] Yes [x] No    If the office needs to give you a call back, can they leave a voicemail: [] Yes [x] No    Grey Caldwell Rep   10/20/23 10:13 EDT

## 2023-11-30 ENCOUNTER — LAB (OUTPATIENT)
Dept: FAMILY MEDICINE CLINIC | Facility: CLINIC | Age: 58
End: 2023-11-30
Payer: COMMERCIAL

## 2023-11-30 DIAGNOSIS — I10 ESSENTIAL HYPERTENSION: Primary | ICD-10-CM

## 2023-11-30 DIAGNOSIS — Z00.00 PREVENTATIVE HEALTH CARE: ICD-10-CM

## 2023-11-30 DIAGNOSIS — Z12.5 SCREENING FOR PROSTATE CANCER: ICD-10-CM

## 2023-11-30 LAB
ALBUMIN SERPL-MCNC: 4.7 G/DL (ref 3.5–5.2)
ALBUMIN/GLOB SERPL: 1.9 G/DL
ALP SERPL-CCNC: 53 U/L (ref 39–117)
ALT SERPL W P-5'-P-CCNC: 26 U/L (ref 1–41)
ANION GAP SERPL CALCULATED.3IONS-SCNC: 14 MMOL/L (ref 5–15)
AST SERPL-CCNC: 22 U/L (ref 1–40)
BASOPHILS # BLD AUTO: 0.05 10*3/MM3 (ref 0–0.2)
BASOPHILS NFR BLD AUTO: 0.6 % (ref 0–1.5)
BILIRUB SERPL-MCNC: 0.5 MG/DL (ref 0–1.2)
BILIRUB UR QL STRIP: NEGATIVE
BUN SERPL-MCNC: 18 MG/DL (ref 6–20)
BUN/CREAT SERPL: 18.8 (ref 7–25)
CALCIUM SPEC-SCNC: 10.2 MG/DL (ref 8.6–10.5)
CHLORIDE SERPL-SCNC: 102 MMOL/L (ref 98–107)
CHOLEST SERPL-MCNC: 169 MG/DL (ref 0–200)
CLARITY UR: CLEAR
CO2 SERPL-SCNC: 25 MMOL/L (ref 22–29)
COLOR UR: YELLOW
CREAT SERPL-MCNC: 0.96 MG/DL (ref 0.76–1.27)
DEPRECATED RDW RBC AUTO: 42.3 FL (ref 37–54)
EGFRCR SERPLBLD CKD-EPI 2021: 91.6 ML/MIN/1.73
EOSINOPHIL # BLD AUTO: 0.18 10*3/MM3 (ref 0–0.4)
EOSINOPHIL NFR BLD AUTO: 2.1 % (ref 0.3–6.2)
ERYTHROCYTE [DISTWIDTH] IN BLOOD BY AUTOMATED COUNT: 13.6 % (ref 12.3–15.4)
GLOBULIN UR ELPH-MCNC: 2.5 GM/DL
GLUCOSE SERPL-MCNC: 96 MG/DL (ref 65–99)
GLUCOSE UR STRIP-MCNC: NEGATIVE MG/DL
HCT VFR BLD AUTO: 44.4 % (ref 37.5–51)
HDLC SERPL-MCNC: 45 MG/DL (ref 40–60)
HGB BLD-MCNC: 14.8 G/DL (ref 13–17.7)
HGB UR QL STRIP.AUTO: NEGATIVE
HOLD SPECIMEN: NORMAL
IMM GRANULOCYTES # BLD AUTO: 0.02 10*3/MM3 (ref 0–0.05)
IMM GRANULOCYTES NFR BLD AUTO: 0.2 % (ref 0–0.5)
KETONES UR QL STRIP: NEGATIVE
LDLC SERPL CALC-MCNC: 103 MG/DL (ref 0–100)
LDLC/HDLC SERPL: 2.24 {RATIO}
LEUKOCYTE ESTERASE UR QL STRIP.AUTO: NEGATIVE
LYMPHOCYTES # BLD AUTO: 2.63 10*3/MM3 (ref 0.7–3.1)
LYMPHOCYTES NFR BLD AUTO: 30.3 % (ref 19.6–45.3)
MCH RBC QN AUTO: 28.6 PG (ref 26.6–33)
MCHC RBC AUTO-ENTMCNC: 33.3 G/DL (ref 31.5–35.7)
MCV RBC AUTO: 85.7 FL (ref 79–97)
MONOCYTES # BLD AUTO: 0.75 10*3/MM3 (ref 0.1–0.9)
MONOCYTES NFR BLD AUTO: 8.7 % (ref 5–12)
NEUTROPHILS NFR BLD AUTO: 5.04 10*3/MM3 (ref 1.7–7)
NEUTROPHILS NFR BLD AUTO: 58.1 % (ref 42.7–76)
NITRITE UR QL STRIP: NEGATIVE
NRBC BLD AUTO-RTO: 0 /100 WBC (ref 0–0.2)
PH UR STRIP.AUTO: 5.5 [PH] (ref 5–8)
PLATELET # BLD AUTO: 264 10*3/MM3 (ref 140–450)
PMV BLD AUTO: 9.4 FL (ref 6–12)
POTASSIUM SERPL-SCNC: 3.8 MMOL/L (ref 3.5–5.2)
PROT SERPL-MCNC: 7.2 G/DL (ref 6–8.5)
PROT UR QL STRIP: NEGATIVE
PSA SERPL-MCNC: 0.29 NG/ML (ref 0–4)
RBC # BLD AUTO: 5.18 10*6/MM3 (ref 4.14–5.8)
SODIUM SERPL-SCNC: 141 MMOL/L (ref 136–145)
SP GR UR STRIP: 1.02 (ref 1–1.03)
TRIGL SERPL-MCNC: 116 MG/DL (ref 0–150)
TSH SERPL DL<=0.05 MIU/L-ACNC: 0.88 UIU/ML (ref 0.27–4.2)
UROBILINOGEN UR QL STRIP: NORMAL
VLDLC SERPL-MCNC: 21 MG/DL (ref 5–40)
WBC NRBC COR # BLD AUTO: 8.67 10*3/MM3 (ref 3.4–10.8)

## 2023-11-30 PROCEDURE — 36415 COLL VENOUS BLD VENIPUNCTURE: CPT

## 2023-11-30 PROCEDURE — 80050 GENERAL HEALTH PANEL: CPT | Performed by: FAMILY MEDICINE

## 2023-11-30 PROCEDURE — 80061 LIPID PANEL: CPT | Performed by: FAMILY MEDICINE

## 2023-11-30 PROCEDURE — 81003 URINALYSIS AUTO W/O SCOPE: CPT | Performed by: FAMILY MEDICINE

## 2023-11-30 PROCEDURE — G0103 PSA SCREENING: HCPCS | Performed by: FAMILY MEDICINE

## 2023-12-13 ENCOUNTER — OFFICE VISIT (OUTPATIENT)
Dept: FAMILY MEDICINE CLINIC | Facility: CLINIC | Age: 58
End: 2023-12-13
Payer: COMMERCIAL

## 2023-12-13 VITALS
WEIGHT: 291.4 LBS | OXYGEN SATURATION: 98 % | HEART RATE: 96 BPM | HEIGHT: 71 IN | BODY MASS INDEX: 40.8 KG/M2 | RESPIRATION RATE: 16 BRPM | DIASTOLIC BLOOD PRESSURE: 86 MMHG | SYSTOLIC BLOOD PRESSURE: 138 MMHG

## 2023-12-13 DIAGNOSIS — E66.3 OVERWEIGHT: ICD-10-CM

## 2023-12-13 DIAGNOSIS — I10 ESSENTIAL HYPERTENSION: ICD-10-CM

## 2023-12-13 DIAGNOSIS — Z00.00 PREVENTATIVE HEALTH CARE: Primary | ICD-10-CM

## 2023-12-13 DIAGNOSIS — M17.0 PRIMARY OSTEOARTHRITIS OF BOTH KNEES: ICD-10-CM

## 2023-12-13 DIAGNOSIS — Z12.11 SCREENING FOR COLON CANCER: ICD-10-CM

## 2023-12-13 DIAGNOSIS — E55.9 VITAMIN D DEFICIENCY: ICD-10-CM

## 2023-12-13 NOTE — PROGRESS NOTES
"Chief Complaint  Annual Exam    Subjective        Pineda Tovar Jr presents to [facility]  History of Present Illness    The patient presents today for his yearly checkup.    He is doing well overall. He had blood work performed and his good cholesterol is slightly elevated.     He has received the COVID-19 vaccine.     He had a total right knee arthroplasty performed by Dr. Jimenez. He has an appointment with Dr. Jimenez on 12/14/2023.     He denies any issues with his breathing. His blood pressure prior to his appointment today on 12/13/2023 was 130/70 mmHg. He lost 80 pounds 3 years ago due to watching his carbohydrate intake. He walks between 18,000- 25,000 steps in a 20-hour period. He works approximately 80 hours per week. His goal is to lose 50 pounds by his next office visit.     He complains of erectile dysfunction. He does not become as erect as he has experienced in the past.     He was seen by Dr. Lozada and was given samples for depression.     His eyes are stable. He had a cornea transplant and needs a new pair of prescription glasses. He was using eye drops for his left eye that caused cataracts. He had the cataract removed.     Objective   Vital Signs:  /86   Pulse 96   Resp 16   Ht 180.3 cm (71\")   Wt 132 kg (291 lb 6.4 oz)   SpO2 98%   BMI 40.64 kg/m²   Estimated body mass index is 40.64 kg/m² as calculated from the following:    Height as of this encounter: 180.3 cm (71\").    Weight as of this encounter: 132 kg (291 lb 6.4 oz).          Physical Exam  Constitutional:       Appearance: Normal appearance. He is well-developed and normal weight.   HENT:      Head: Normocephalic and atraumatic.      Right Ear: Tympanic membrane, ear canal and external ear normal.      Left Ear: Tympanic membrane, ear canal and external ear normal.      Nose: Nose normal.      Mouth/Throat:      Mouth: Mucous membranes are moist.      Pharynx: Oropharynx is clear. No " oropharyngeal exudate.   Eyes:      Extraocular Movements: Extraocular movements intact.      Conjunctiva/sclera: Conjunctivae normal.      Pupils: Pupils are equal, round, and reactive to light.   Cardiovascular:      Rate and Rhythm: Normal rate and regular rhythm.      Pulses: Normal pulses.      Heart sounds: Normal heart sounds.   Pulmonary:      Effort: Pulmonary effort is normal.      Breath sounds: Normal breath sounds.   Abdominal:      General: Bowel sounds are normal.      Palpations: Abdomen is soft.      Comments: Obese in abdomen   Musculoskeletal:         General: Normal range of motion.      Cervical back: Normal range of motion and neck supple.      Comments: OA of knees.   S/P right TKR.      Skin:     General: Skin is warm and dry.   Neurological:      General: No focal deficit present.      Mental Status: He is alert and oriented to person, place, and time. Mental status is at baseline.   Psychiatric:         Mood and Affect: Mood normal.         Behavior: Behavior normal.         Thought Content: Thought content normal.         Judgment: Judgment normal.        Result Review :                   Assessment and Plan:    1. Preventative health care (Primary)  Pineda is a 58-year-old white male who is followed in our office for his general medical care. Pineda Tovar Jr has hypertension, vitamin D deficiency, osteoarthritis, and he is overweight. He takes the medicine for these conditions, and he is followed up usually yearly. He had some blood work done, which we reviewed today and for the most part, he is in good shape. His urinalysis was clear. His PSA was normal. CBC was normal. The comprehensive panel was normal. Lipid panel was normal except for a slight elevation of his LDL. His thyroid is normal, and urine as mentioned was normal. He feels well, although when he is working, he works a 7-day week and then he is off a week. At 7-day week, sometimes it is 80 hours. He is certainly tired when he  is on that shift, but he gets the week off after that and that helps him recover. We are going to refer him to gastroenterology for colonoscopy, which he has never had.  -     Ambulatory Referral to Gastroenterology    2. Essential hypertension  - Blood pressure today was very well controlled for in office reading 138/86 mmHg. He will continue his lisinopril with hydrochlorothiazide 10/12.5 mg once a day.    3. Vitamin D deficiency  - The patient continues to take his vitamin D, usually 1000 units a day.    4. Overweight  - The patient weighs about 291 pounds and really can get his weight down with exercise and diet.    5. Primary osteoarthritis of both knees  - The patient's BMI was 40. He is going to go on a reduced calorie, carbohydrate restricted diet and try to get 50 pounds off this year. He has done it before, so he feels like he can do it again. We will recheck it again when we see him in a year.    6. Screening for colon cancer  - The patient will have a colonoscopy done as soon as possible.  -     Ambulatory Referral to Gastroenterology         Follow Up   Return in about 1 year (around 12/13/2024) for Annual physical-ONE YEAR      Recheck--6mos if needed.  Patient was given instructions and counseling regarding his condition or for health maintenance advice. Please see specific information pulled into the AVS if appropriate.       Transcribed from ambient dictation for Randell Vigil MD by Patsy Gray.  12/13/23   12:30 EST    Patient or patient representative verbalized consent to the visit recording.  I have personally performed the services described in this document as transcribed by the above individual, and it is both accurate and complete.  Randell Vigil MD  12/15/2023  10:15 EST

## 2023-12-15 RX ORDER — TADALAFIL 10 MG/1
10 TABLET ORAL DAILY PRN
Qty: 5 TABLET | Refills: 2 | Status: SHIPPED | OUTPATIENT
Start: 2023-12-15

## 2024-01-17 ENCOUNTER — TRANSCRIBE ORDERS (OUTPATIENT)
Dept: LAB | Facility: HOSPITAL | Age: 59
End: 2024-01-17
Payer: COMMERCIAL

## 2024-01-17 ENCOUNTER — HOSPITAL ENCOUNTER (OUTPATIENT)
Dept: CARDIOLOGY | Facility: HOSPITAL | Age: 59
Discharge: HOME | End: 2024-01-17
Payer: COMMERCIAL

## 2024-01-17 ENCOUNTER — LAB (OUTPATIENT)
Dept: LAB | Facility: HOSPITAL | Age: 59
End: 2024-01-17
Payer: COMMERCIAL

## 2024-01-17 DIAGNOSIS — R73.09 IMPAIRED GLUCOSE TOLERANCE TEST: ICD-10-CM

## 2024-01-17 DIAGNOSIS — Z01.818 PRE-OP TESTING: ICD-10-CM

## 2024-01-17 DIAGNOSIS — Z01.818 PRE-OP TESTING: Primary | ICD-10-CM

## 2024-01-17 LAB
ALBUMIN SERPL-MCNC: 4.6 G/DL (ref 3.5–5.2)
ANION GAP SERPL CALCULATED.3IONS-SCNC: 14.4 MMOL/L (ref 5–15)
BASOPHILS # BLD AUTO: 0.07 10*3/MM3 (ref 0–0.2)
BASOPHILS NFR BLD AUTO: 0.7 % (ref 0–1.5)
BUN SERPL-MCNC: 18 MG/DL (ref 6–20)
BUN/CREAT SERPL: 18.2 (ref 7–25)
CALCIUM SPEC-SCNC: 9.9 MG/DL (ref 8.6–10.5)
CHLORIDE SERPL-SCNC: 105 MMOL/L (ref 98–107)
CO2 SERPL-SCNC: 24.6 MMOL/L (ref 22–29)
CREAT SERPL-MCNC: 0.99 MG/DL (ref 0.76–1.27)
DEPRECATED RDW RBC AUTO: 41.5 FL (ref 37–54)
EGFRCR SERPLBLD CKD-EPI 2021: 88.3 ML/MIN/1.73
EOSINOPHIL # BLD AUTO: 0.22 10*3/MM3 (ref 0–0.4)
EOSINOPHIL NFR BLD AUTO: 2.2 % (ref 0.3–6.2)
ERYTHROCYTE [DISTWIDTH] IN BLOOD BY AUTOMATED COUNT: 13.2 % (ref 12.3–15.4)
GLUCOSE SERPL-MCNC: 83 MG/DL (ref 65–99)
HBA1C MFR BLD: 5.8 % (ref 4.8–5.6)
HCT VFR BLD AUTO: 46.9 % (ref 37.5–51)
HGB BLD-MCNC: 15.4 G/DL (ref 13–17.7)
IMM GRANULOCYTES # BLD AUTO: 0.04 10*3/MM3 (ref 0–0.05)
IMM GRANULOCYTES NFR BLD AUTO: 0.4 % (ref 0–0.5)
LYMPHOCYTES # BLD AUTO: 3.13 10*3/MM3 (ref 0.7–3.1)
LYMPHOCYTES NFR BLD AUTO: 31.1 % (ref 19.6–45.3)
MCH RBC QN AUTO: 28.1 PG (ref 26.6–33)
MCHC RBC AUTO-ENTMCNC: 32.8 G/DL (ref 31.5–35.7)
MCV RBC AUTO: 85.6 FL (ref 79–97)
MONOCYTES # BLD AUTO: 0.71 10*3/MM3 (ref 0.1–0.9)
MONOCYTES NFR BLD AUTO: 7.1 % (ref 5–12)
MRSA DNA SPEC QL NAA+PROBE: NORMAL
NEUTROPHILS NFR BLD AUTO: 5.88 10*3/MM3 (ref 1.7–7)
NEUTROPHILS NFR BLD AUTO: 58.5 % (ref 42.7–76)
NRBC BLD AUTO-RTO: 0 /100 WBC (ref 0–0.2)
PLATELET # BLD AUTO: 268 10*3/MM3 (ref 140–450)
PMV BLD AUTO: 9.4 FL (ref 6–12)
POTASSIUM SERPL-SCNC: 4 MMOL/L (ref 3.5–5.2)
QT INTERVAL: 438 MS
QTC INTERVAL: 424 MS
RBC # BLD AUTO: 5.48 10*6/MM3 (ref 4.14–5.8)
SODIUM SERPL-SCNC: 144 MMOL/L (ref 136–145)
WBC NRBC COR # BLD AUTO: 10.05 10*3/MM3 (ref 3.4–10.8)

## 2024-01-17 PROCEDURE — 87641 MR-STAPH DNA AMP PROBE: CPT

## 2024-01-17 PROCEDURE — 85025 COMPLETE CBC W/AUTO DIFF WBC: CPT

## 2024-01-17 PROCEDURE — 80048 BASIC METABOLIC PNL TOTAL CA: CPT

## 2024-01-17 PROCEDURE — 83036 HEMOGLOBIN GLYCOSYLATED A1C: CPT

## 2024-01-17 PROCEDURE — 93005 ELECTROCARDIOGRAM TRACING: CPT | Performed by: ORTHOPAEDIC SURGERY

## 2024-01-17 PROCEDURE — 36415 COLL VENOUS BLD VENIPUNCTURE: CPT

## 2024-01-17 PROCEDURE — 82040 ASSAY OF SERUM ALBUMIN: CPT

## 2024-01-24 LAB
QT INTERVAL: 438 MS
QTC INTERVAL: 424 MS

## 2024-02-26 RX ORDER — LISINOPRIL AND HYDROCHLOROTHIAZIDE 12.5; 1 MG/1; MG/1
1 TABLET ORAL DAILY
Qty: 30 TABLET | Refills: 3 | Status: SHIPPED | OUTPATIENT
Start: 2024-02-26

## 2024-04-11 ENCOUNTER — HOSPITAL ENCOUNTER (OUTPATIENT)
Dept: CARDIOLOGY | Facility: HOSPITAL | Age: 59
Discharge: HOME | End: 2024-04-11
Payer: COMMERCIAL

## 2024-04-11 ENCOUNTER — LAB (OUTPATIENT)
Dept: LAB | Facility: HOSPITAL | Age: 59
End: 2024-04-11
Payer: COMMERCIAL

## 2024-04-11 ENCOUNTER — TRANSCRIBE ORDERS (OUTPATIENT)
Dept: ADMINISTRATIVE | Facility: HOSPITAL | Age: 59
End: 2024-04-11
Payer: COMMERCIAL

## 2024-04-11 ENCOUNTER — HOSPITAL ENCOUNTER (OUTPATIENT)
Dept: GENERAL RADIOLOGY | Facility: HOSPITAL | Age: 59
Discharge: HOME | End: 2024-04-11
Payer: COMMERCIAL

## 2024-04-11 DIAGNOSIS — Z01.818 PRE-OP TESTING: ICD-10-CM

## 2024-04-11 DIAGNOSIS — I10 ESSENTIAL HYPERTENSION, MALIGNANT: ICD-10-CM

## 2024-04-11 DIAGNOSIS — I10 ESSENTIAL HYPERTENSION, MALIGNANT: Primary | ICD-10-CM

## 2024-04-11 LAB
ANION GAP SERPL CALCULATED.3IONS-SCNC: 12 MMOL/L (ref 5–15)
BASOPHILS # BLD AUTO: 0.04 10*3/MM3 (ref 0–0.2)
BASOPHILS NFR BLD AUTO: 0.5 % (ref 0–1.5)
BUN SERPL-MCNC: 18 MG/DL (ref 6–20)
BUN/CREAT SERPL: 19.4 (ref 7–25)
CALCIUM SPEC-SCNC: 10.2 MG/DL (ref 8.6–10.5)
CHLORIDE SERPL-SCNC: 99 MMOL/L (ref 98–107)
CO2 SERPL-SCNC: 27 MMOL/L (ref 22–29)
CREAT SERPL-MCNC: 0.93 MG/DL (ref 0.76–1.27)
DEPRECATED RDW RBC AUTO: 42.6 FL (ref 37–54)
EGFRCR SERPLBLD CKD-EPI 2021: 94.6 ML/MIN/1.73
EOSINOPHIL # BLD AUTO: 0.17 10*3/MM3 (ref 0–0.4)
EOSINOPHIL NFR BLD AUTO: 1.9 % (ref 0.3–6.2)
ERYTHROCYTE [DISTWIDTH] IN BLOOD BY AUTOMATED COUNT: 13.2 % (ref 12.3–15.4)
GLUCOSE SERPL-MCNC: 115 MG/DL (ref 65–99)
HCT VFR BLD AUTO: 44.9 % (ref 37.5–51)
HGB BLD-MCNC: 13.9 G/DL (ref 13–17.7)
IMM GRANULOCYTES # BLD AUTO: 0.03 10*3/MM3 (ref 0–0.05)
IMM GRANULOCYTES NFR BLD AUTO: 0.3 % (ref 0–0.5)
LYMPHOCYTES # BLD AUTO: 2.25 10*3/MM3 (ref 0.7–3.1)
LYMPHOCYTES NFR BLD AUTO: 25.8 % (ref 19.6–45.3)
MCH RBC QN AUTO: 27 PG (ref 26.6–33)
MCHC RBC AUTO-ENTMCNC: 31 G/DL (ref 31.5–35.7)
MCV RBC AUTO: 87.4 FL (ref 79–97)
MONOCYTES # BLD AUTO: 0.67 10*3/MM3 (ref 0.1–0.9)
MONOCYTES NFR BLD AUTO: 7.7 % (ref 5–12)
NEUTROPHILS NFR BLD AUTO: 5.57 10*3/MM3 (ref 1.7–7)
NEUTROPHILS NFR BLD AUTO: 63.8 % (ref 42.7–76)
NRBC BLD AUTO-RTO: 0 /100 WBC (ref 0–0.2)
PLATELET # BLD AUTO: 297 10*3/MM3 (ref 140–450)
PMV BLD AUTO: 8.9 FL (ref 6–12)
POTASSIUM SERPL-SCNC: 4 MMOL/L (ref 3.5–5.2)
QT INTERVAL: 406 MS
QTC INTERVAL: 413 MS
RBC # BLD AUTO: 5.14 10*6/MM3 (ref 4.14–5.8)
SODIUM SERPL-SCNC: 138 MMOL/L (ref 136–145)
WBC NRBC COR # BLD AUTO: 8.73 10*3/MM3 (ref 3.4–10.8)

## 2024-04-11 PROCEDURE — 80048 BASIC METABOLIC PNL TOTAL CA: CPT

## 2024-04-11 PROCEDURE — 85025 COMPLETE CBC W/AUTO DIFF WBC: CPT

## 2024-04-11 PROCEDURE — 71046 X-RAY EXAM CHEST 2 VIEWS: CPT

## 2024-04-11 PROCEDURE — 36415 COLL VENOUS BLD VENIPUNCTURE: CPT

## 2024-04-11 PROCEDURE — 93005 ELECTROCARDIOGRAM TRACING: CPT | Performed by: PODIATRIST

## 2024-04-18 LAB
QT INTERVAL: 406 MS
QTC INTERVAL: 413 MS

## 2024-06-27 RX ORDER — LISINOPRIL AND HYDROCHLOROTHIAZIDE 12.5; 1 MG/1; MG/1
1 TABLET ORAL DAILY
Qty: 30 TABLET | Refills: 3 | Status: SHIPPED | OUTPATIENT
Start: 2024-06-27

## 2024-10-24 ENCOUNTER — CLINICAL SUPPORT (OUTPATIENT)
Dept: FAMILY MEDICINE CLINIC | Facility: CLINIC | Age: 59
End: 2024-10-24
Payer: COMMERCIAL

## 2024-10-24 DIAGNOSIS — Z23 NEED FOR VACCINATION: Primary | ICD-10-CM

## 2024-10-24 PROCEDURE — 90656 IIV3 VACC NO PRSV 0.5 ML IM: CPT | Performed by: FAMILY MEDICINE

## 2024-10-24 PROCEDURE — 90471 IMMUNIZATION ADMIN: CPT | Performed by: FAMILY MEDICINE

## 2024-11-11 ENCOUNTER — TELEPHONE (OUTPATIENT)
Dept: FAMILY MEDICINE CLINIC | Facility: CLINIC | Age: 59
End: 2024-11-11
Payer: COMMERCIAL

## 2024-11-11 RX ORDER — LISINOPRIL AND HYDROCHLOROTHIAZIDE 10; 12.5 MG/1; MG/1
1 TABLET ORAL DAILY
Qty: 90 TABLET | Refills: 3 | Status: SHIPPED | OUTPATIENT
Start: 2024-11-11

## 2024-11-11 NOTE — TELEPHONE ENCOUNTER
PT IS NEEDING A REFILL ON LISINOPRIL. CAN BE SENT TO [facility]  [address], [place], [address]

## 2024-12-09 ENCOUNTER — LAB (OUTPATIENT)
Dept: FAMILY MEDICINE CLINIC | Facility: CLINIC | Age: 59
End: 2024-12-09
Payer: COMMERCIAL

## 2024-12-09 DIAGNOSIS — I10 PRIMARY HYPERTENSION: ICD-10-CM

## 2024-12-09 DIAGNOSIS — E53.8 VITAMIN B12 DEFICIENCY: ICD-10-CM

## 2024-12-09 DIAGNOSIS — Z12.5 SCREENING FOR MALIGNANT NEOPLASM OF PROSTATE: Primary | ICD-10-CM

## 2024-12-09 DIAGNOSIS — Z13.29 THYROID DISORDER SCREEN: ICD-10-CM

## 2024-12-09 DIAGNOSIS — E55.9 VITAMIN D DEFICIENCY: ICD-10-CM

## 2024-12-09 DIAGNOSIS — I10 HYPERTENSION, UNSPECIFIED TYPE: ICD-10-CM

## 2024-12-09 DIAGNOSIS — E78.5 HYPERLIPIDEMIA, UNSPECIFIED HYPERLIPIDEMIA TYPE: ICD-10-CM

## 2024-12-09 DIAGNOSIS — R79.9 ABNORMAL BLOOD CHEMISTRY: ICD-10-CM

## 2024-12-09 LAB
25(OH)D3 SERPL-MCNC: 49.1 NG/ML (ref 30–100)
ALBUMIN SERPL-MCNC: 3.6 G/DL (ref 3.5–5.2)
ALBUMIN/GLOB SERPL: 0.9 G/DL
ALP SERPL-CCNC: 58 U/L (ref 39–117)
ALT SERPL W P-5'-P-CCNC: 24 U/L (ref 1–41)
ANION GAP SERPL CALCULATED.3IONS-SCNC: 11.8 MMOL/L (ref 5–15)
AST SERPL-CCNC: 19 U/L (ref 1–40)
BASOPHILS # BLD AUTO: 0.04 10*3/MM3 (ref 0–0.2)
BASOPHILS NFR BLD AUTO: 0.5 % (ref 0–1.5)
BILIRUB SERPL-MCNC: 0.3 MG/DL (ref 0–1.2)
BILIRUB UR QL STRIP: NEGATIVE
BUN SERPL-MCNC: 21 MG/DL (ref 6–20)
BUN/CREAT SERPL: 18.4 (ref 7–25)
CALCIUM SPEC-SCNC: 9.9 MG/DL (ref 8.6–10.5)
CHLORIDE SERPL-SCNC: 103 MMOL/L (ref 98–107)
CHOLEST SERPL-MCNC: 179 MG/DL (ref 0–200)
CLARITY UR: CLEAR
CO2 SERPL-SCNC: 26.2 MMOL/L (ref 22–29)
COLOR UR: YELLOW
CREAT SERPL-MCNC: 1.14 MG/DL (ref 0.76–1.27)
DEPRECATED RDW RBC AUTO: 40.6 FL (ref 37–54)
EGFRCR SERPLBLD CKD-EPI 2021: 74.1 ML/MIN/1.73
EOSINOPHIL # BLD AUTO: 0.16 10*3/MM3 (ref 0–0.4)
EOSINOPHIL NFR BLD AUTO: 2.1 % (ref 0.3–6.2)
ERYTHROCYTE [DISTWIDTH] IN BLOOD BY AUTOMATED COUNT: 13.4 % (ref 12.3–15.4)
GLOBULIN UR ELPH-MCNC: 3.8 GM/DL
GLUCOSE SERPL-MCNC: 110 MG/DL (ref 65–99)
GLUCOSE UR STRIP-MCNC: NEGATIVE MG/DL
HBA1C MFR BLD: 5.8 % (ref 4.8–5.6)
HCT VFR BLD AUTO: 45 % (ref 37.5–51)
HDLC SERPL-MCNC: 46 MG/DL (ref 40–60)
HGB BLD-MCNC: 15 G/DL (ref 13–17.7)
HGB UR QL STRIP.AUTO: NEGATIVE
HOLD SPECIMEN: NORMAL
IMM GRANULOCYTES # BLD AUTO: 0.03 10*3/MM3 (ref 0–0.05)
IMM GRANULOCYTES NFR BLD AUTO: 0.4 % (ref 0–0.5)
KETONES UR QL STRIP: NEGATIVE
LDLC SERPL CALC-MCNC: 110 MG/DL (ref 0–100)
LDLC/HDLC SERPL: 2.33 {RATIO}
LEUKOCYTE ESTERASE UR QL STRIP.AUTO: NEGATIVE
LYMPHOCYTES # BLD AUTO: 2.35 10*3/MM3 (ref 0.7–3.1)
LYMPHOCYTES NFR BLD AUTO: 31 % (ref 19.6–45.3)
MCH RBC QN AUTO: 28.4 PG (ref 26.6–33)
MCHC RBC AUTO-ENTMCNC: 33.3 G/DL (ref 31.5–35.7)
MCV RBC AUTO: 85.2 FL (ref 79–97)
MONOCYTES # BLD AUTO: 0.6 10*3/MM3 (ref 0.1–0.9)
MONOCYTES NFR BLD AUTO: 7.9 % (ref 5–12)
NEUTROPHILS NFR BLD AUTO: 4.39 10*3/MM3 (ref 1.7–7)
NEUTROPHILS NFR BLD AUTO: 58.1 % (ref 42.7–76)
NITRITE UR QL STRIP: NEGATIVE
NRBC BLD AUTO-RTO: 0 /100 WBC (ref 0–0.2)
PH UR STRIP.AUTO: 6 [PH] (ref 5–8)
PLATELET # BLD AUTO: 245 10*3/MM3 (ref 140–450)
PMV BLD AUTO: 9.2 FL (ref 6–12)
POTASSIUM SERPL-SCNC: 4.1 MMOL/L (ref 3.5–5.2)
PROT SERPL-MCNC: 7.4 G/DL (ref 6–8.5)
PROT UR QL STRIP: NEGATIVE
PSA SERPL-MCNC: 0.29 NG/ML (ref 0–4)
RBC # BLD AUTO: 5.28 10*6/MM3 (ref 4.14–5.8)
SODIUM SERPL-SCNC: 141 MMOL/L (ref 136–145)
SP GR UR STRIP: 1.02 (ref 1–1.03)
TRIGL SERPL-MCNC: 129 MG/DL (ref 0–150)
TSH SERPL DL<=0.05 MIU/L-ACNC: 0.69 UIU/ML (ref 0.27–4.2)
UROBILINOGEN UR QL STRIP: NORMAL
VIT B12 BLD-MCNC: 554 PG/ML (ref 211–946)
VLDLC SERPL-MCNC: 23 MG/DL (ref 5–40)
WBC NRBC COR # BLD AUTO: 7.57 10*3/MM3 (ref 3.4–10.8)

## 2024-12-09 PROCEDURE — 36415 COLL VENOUS BLD VENIPUNCTURE: CPT

## 2024-12-09 PROCEDURE — 82306 VITAMIN D 25 HYDROXY: CPT | Performed by: FAMILY MEDICINE

## 2024-12-09 PROCEDURE — G0103 PSA SCREENING: HCPCS | Performed by: FAMILY MEDICINE

## 2024-12-09 PROCEDURE — 82607 VITAMIN B-12: CPT | Performed by: FAMILY MEDICINE

## 2024-12-09 PROCEDURE — 80050 GENERAL HEALTH PANEL: CPT | Performed by: FAMILY MEDICINE

## 2024-12-09 PROCEDURE — 81003 URINALYSIS AUTO W/O SCOPE: CPT | Performed by: FAMILY MEDICINE

## 2024-12-09 PROCEDURE — 83036 HEMOGLOBIN GLYCOSYLATED A1C: CPT | Performed by: FAMILY MEDICINE

## 2024-12-09 PROCEDURE — 80061 LIPID PANEL: CPT | Performed by: FAMILY MEDICINE

## 2024-12-26 ENCOUNTER — OFFICE VISIT (OUTPATIENT)
Dept: FAMILY MEDICINE CLINIC | Facility: CLINIC | Age: 59
End: 2024-12-26
Payer: COMMERCIAL

## 2024-12-26 VITALS
SYSTOLIC BLOOD PRESSURE: 136 MMHG | WEIGHT: 300.4 LBS | RESPIRATION RATE: 18 BRPM | HEART RATE: 65 BPM | DIASTOLIC BLOOD PRESSURE: 74 MMHG | OXYGEN SATURATION: 97 % | BODY MASS INDEX: 42.06 KG/M2 | HEIGHT: 71 IN

## 2024-12-26 DIAGNOSIS — Z94.7 HISTORY OF CORNEA TRANSPLANT: ICD-10-CM

## 2024-12-26 DIAGNOSIS — I10 ESSENTIAL HYPERTENSION: ICD-10-CM

## 2024-12-26 DIAGNOSIS — Z00.00 PREVENTATIVE HEALTH CARE: Primary | ICD-10-CM

## 2024-12-26 DIAGNOSIS — E66.3 OVERWEIGHT: ICD-10-CM

## 2024-12-26 DIAGNOSIS — Z96.653 TOTAL KNEE REPLACEMENT STATUS, BILATERAL: ICD-10-CM

## 2024-12-26 PROBLEM — M17.0 PRIMARY OSTEOARTHRITIS OF BOTH KNEES: Status: RESOLVED | Noted: 2022-10-27 | Resolved: 2024-12-26

## 2024-12-26 PROCEDURE — 99396 PREV VISIT EST AGE 40-64: CPT | Performed by: FAMILY MEDICINE

## 2024-12-26 NOTE — PROGRESS NOTES
"Chief Complaint  Annual Exam    Subjective        Pineda Tovar Jr presents to [facility]  History of Present Illness  The patient is a 59-year-old male who presents for his yearly physical.    He has been in reasonably good health. He retired from [employer] in August 2024 after 40 years of service. Since his jail, he has experienced some weight gain. He has been eating a lot of bologna sandwiches. He has been making efforts to control his carbohydrate intake and has recently purchased a basketball to increase his physical activity. He has never consumed milk or bread. He reports feeling well overall. He had a colonoscopy while he was off for his knee surgery, during which 3 or 4 polyps were found. He was advised to return for another colonoscopy in 3 to 5 years.    He is under treatment for hypertension.    He is morbidly obese with a weight of 300 pounds. He has plans to increase his physical activity with some basketball, walking, and exercise, and he is committed to a low-carb diet. He lost 80 pounds in 2019, reducing his weight from 300 to 219 pounds, but has since regained some weight.    He has a history of corneal transplant. His eyes are stable without any major changes, and he is followed once or twice a year by the eye doctor.    He has a history of bilateral total knee replacements and is moving quite well. He is considering obtaining a handicap access form due to his arthritis. He reports no current pain and notes that his left knee is functioning well.    Supplemental Information  He had a bunion surgery on his right foot by Dr. Lora and took 8 weeks off for recovery.    SOCIAL HISTORY  He retired from [employer] in August 2024 after 40 years of service.    MEDICATIONS  Current: lisinopril with hydrochlorothiazide       Objective   Vital Signs:  /74   Pulse 65   Resp 18   Ht 180.3 cm (71\")   Wt (!) 136 kg (300 lb 6.4 oz)   SpO2 97%   BMI 41.90 kg/m² " "  Estimated body mass index is 41.9 kg/m² as calculated from the following:    Height as of this encounter: 180.3 cm (71\").    Weight as of this encounter: 136 kg (300 lb 6.4 oz).       Class 3 Severe Obesity (BMI >=40). Obesity-related health conditions include the following: hypertension, dyslipidemias, and osteoarthritis. Obesity is improving with treatment. BMI is is above average; BMI management plan is completed. We discussed low calorie, low carb based diet program, portion control, increasing exercise, and joining a fitness center or start home based exercise program.      Physical Exam  Constitutional:       Appearance: Normal appearance. He is well-developed and normal weight.   HENT:      Head: Normocephalic and atraumatic.      Right Ear: Tympanic membrane, ear canal and external ear normal.      Left Ear: Tympanic membrane, ear canal and external ear normal.      Nose: Nose normal.      Mouth/Throat:      Mouth: Mucous membranes are moist.      Pharynx: Oropharynx is clear. No oropharyngeal exudate.   Eyes:      Extraocular Movements: Extraocular movements intact.      Conjunctiva/sclera: Conjunctivae normal.      Pupils: Pupils are equal, round, and reactive to light.   Cardiovascular:      Rate and Rhythm: Normal rate and regular rhythm.      Pulses: Normal pulses.      Heart sounds: Normal heart sounds.   Pulmonary:      Effort: Pulmonary effort is normal.      Breath sounds: Normal breath sounds.   Abdominal:      General: Bowel sounds are normal.      Palpations: Abdomen is soft.   Musculoskeletal:         General: Normal range of motion.      Cervical back: Normal range of motion and neck supple.   Skin:     General: Skin is warm and dry.   Neurological:      General: No focal deficit present.      Mental Status: He is alert and oriented to person, place, and time. Mental status is at baseline.   Psychiatric:         Mood and Affect: Mood normal.         Behavior: Behavior normal.         Thought " Content: Thought content normal.         Judgment: Judgment normal.        Result Review :          Results  Laboratory Studies  Sodium is slightly low at about 110. LDL cholesterol is 110. A1c is 5.8%. PSA is normal. Lipid panel is normal except for high-risk cholesterol, which is mildly elevated. BUN is slightly elevated. Kidney function is normal. Electrolytes are normal. Liver function tests are normal. Protein and albumin are normal. B12 is 554. Vitamin D is 49. Thyroid function is normal. White count is normal. Hemoglobin is normal. Platelets are normal at 245. Colonoscopy was normal except for some benign polyps.                Assessment & Plan  1. Preventative healthcare.  He is a 59-year-old white male presenting for his annual physical examination. He has enjoyed reasonably good health. His recent lab results are largely within normal limits, except for a slightly elevated LDL level. He recently underwent a colonoscopy, which was normal except for some benign polyps.    2. Essential hypertension.  His blood pressure today was 136/74. He will continue his current medication regimen of lisinopril with hydrochlorothiazide. With weight loss, his blood pressure is expected to improve further.    3. Overweight-morbid obesity.  He currently weighs 300 pounds. He is advised to follow a low-carb, low-calorie diet and increase physical activity, including basketball and walking. A weight loss of at least 20 pounds is anticipated over the next 6 months.    4. History of corneal transplant.  His eyes are stable with no major changes. He is followed by an eye doctor once or twice a year.    5. Bilateral total knee replacements.  He has had bilateral total knee replacements and is moving well. He is encouraged to increase his physical activity to aid in weight loss and improve physical conditioning.    Follow-up  The patient will follow up in 6 months for a weight check and a quick review and in 1 year for another  physical.    PROCEDURE  The patient underwent a colonoscopy recently, which was normal except for some benign polyps.            Follow Up     Return if symptoms worsen or fail to improve, for Recheck-6 months                annual physical-1 year.  Patient was given instructions and counseling regarding his condition or for health maintenance advice. Please see specific information pulled into the AVS if appropriate.     Patient or patient representative verbalized consent for the use of Ambient Listening during the visit with  Randell Vigil MD for chart documentation. 12/26/2024  09:04 EST